# Patient Record
Sex: FEMALE | Race: BLACK OR AFRICAN AMERICAN | NOT HISPANIC OR LATINO | URBAN - METROPOLITAN AREA
[De-identification: names, ages, dates, MRNs, and addresses within clinical notes are randomized per-mention and may not be internally consistent; named-entity substitution may affect disease eponyms.]

---

## 2017-11-21 ENCOUNTER — APPOINTMENT (RX ONLY)
Dept: URBAN - METROPOLITAN AREA CLINIC 77 | Facility: CLINIC | Age: 42
Setting detail: DERMATOLOGY
End: 2017-11-21

## 2017-11-21 DIAGNOSIS — Z41.9 ENCOUNTER FOR PROCEDURE FOR PURPOSES OTHER THAN REMEDYING HEALTH STATE, UNSPECIFIED: ICD-10-CM

## 2017-11-21 PROCEDURE — ? LASER HAIR REMOVAL

## 2017-11-21 ASSESSMENT — LOCATION DETAILED DESCRIPTION DERM
LOCATION DETAILED: RIGHT ANTERIOR PROXIMAL THIGH
LOCATION DETAILED: LEFT ANTERIOR PROXIMAL THIGH
LOCATION DETAILED: RIGHT INGUINAL CREASE
LOCATION DETAILED: RIGHT BUTTOCK
LOCATION DETAILED: MONS PUBIS
LOCATION DETAILED: LEFT SUPRAPUBIC SKIN
LOCATION DETAILED: RIGHT MEDIAL BUTTOCK

## 2017-11-21 ASSESSMENT — LOCATION SIMPLE DESCRIPTION DERM
LOCATION SIMPLE: GROIN
LOCATION SIMPLE: RIGHT THIGH
LOCATION SIMPLE: LEFT THIGH
LOCATION SIMPLE: RIGHT BUTTOCK

## 2017-11-21 ASSESSMENT — LOCATION ZONE DERM
LOCATION ZONE: VULVA
LOCATION ZONE: LEG
LOCATION ZONE: TRUNK

## 2017-11-21 NOTE — PROCEDURE: LASER HAIR REMOVAL
Cooling: chill tip
Price (Use Numbers Only, No Special Characters Or $): 178.57
Pulse Duration: 0.45 ms
Device Serial Number (Optional): 0
Total Pulses: 56
Spot Size: 1.5 mm
Cooling: DCD setting
Laser Type: Vbeam 595nm
Pulse Duration: 25 ms
Post-Care Instructions: I reviewed with the patient in detail post-care instructions. Patient should avoid sun for a minimum of 4 weeks before and after treatment.
Fluence (Will Not Render If 0): 13
Total Pulses: 232
Location Override: full bikini and buttocks
Spot Size: 5 mm
Fluence (Will Not Render If 0): 20
Consent: Written consent obtained, risks reviewed including but not limited to crusting, scabbing, blistering, scarring, darker or lighter pigmentary change, paradoxical hair regrowth, incomplete removal of hair and infection.
Treatment Number: 1
Shaving (Optional): The patient shaved at home
Cooling Override: 100%
Length Of Topical Anesthesia Application (Optional): 20 minutes
Pulse Duration: 30 ms
Fluence (Will Not Render If 0): 20
Tolerated Procedure (Optional): 3 out of 10
Topical Anesthesia Type: BLT cream (benzocaine 20%, lidocaine 6%, tetracaine 4%)
Number Of Prepaid Treatments (Will Not Render If 0): 7
Detail Level: Zone
Fluence (Will Not Render If 0): 6
Pre-Procedure: Prior to proceeding the treatment areas were cleaned and all present put on their eye protection.
Post-Procedure Care: Immediate endpoint: perifollicular erythema and edema. Elta Laser Cooling Enzyme Gel and spf. Post care reviewed with patient: wear sunscreen, do not pluck or wax in between patients and use mint exfoliating scrub on day 5 for 5 days in a row.
Render Post-Care In The Note: No

## 2018-07-05 ENCOUNTER — APPOINTMENT (RX ONLY)
Dept: URBAN - METROPOLITAN AREA CLINIC 77 | Facility: CLINIC | Age: 43
Setting detail: DERMATOLOGY
End: 2018-07-05

## 2018-07-05 DIAGNOSIS — Z41.9 ENCOUNTER FOR PROCEDURE FOR PURPOSES OTHER THAN REMEDYING HEALTH STATE, UNSPECIFIED: ICD-10-CM

## 2018-07-05 PROCEDURE — ? LASER HAIR REMOVAL

## 2018-07-05 ASSESSMENT — LOCATION ZONE DERM
LOCATION ZONE: VULVA
LOCATION ZONE: LEG
LOCATION ZONE: TRUNK

## 2018-07-05 ASSESSMENT — LOCATION SIMPLE DESCRIPTION DERM
LOCATION SIMPLE: RIGHT THIGH
LOCATION SIMPLE: GROIN
LOCATION SIMPLE: GLUTEAL CLEFT
LOCATION SIMPLE: LEFT THIGH

## 2018-07-05 ASSESSMENT — LOCATION DETAILED DESCRIPTION DERM
LOCATION DETAILED: RIGHT ANTERIOR PROXIMAL THIGH
LOCATION DETAILED: RIGHT SUPRAPUBIC SKIN
LOCATION DETAILED: MONS PUBIS
LOCATION DETAILED: GLUTEAL CLEFT
LOCATION DETAILED: LEFT ANTERIOR PROXIMAL THIGH

## 2018-07-05 NOTE — PROCEDURE: LASER HAIR REMOVAL
Device Serial Number (Optional): 0
Spot Size: 1.5 mm
Cooling: chill tip
Laser Type: Nd:Yag 1064nm
Cooling Override: 100%
Pulse Duration: 25 ms
Total Pulses: 113
Cooling: DCD setting
Fluence (Will Not Render If 0): 13
Pulse Duration: 35 ms
Post-Care Instructions: I reviewed with the patient in detail post-care instructions. Patient should avoid sun for a minimum of 4 weeks before and after treatment.
Tolerated Procedure (Optional): 6 out of 10
Topical Anesthesia Type: BLT cream (benzocaine 20%, lidocaine 6%, tetracaine 4%)
Post-Procedure Care: Immediate endpoint: perifollicular erythema and edema. Elta Laser Cooling Enzyme Gel and spf. Post care reviewed with patient: wear sunscreen, do not pluck or wax in between patients and use mint exfoliating scrub on day 5 for 5 days in a row.
Length Of Topical Anesthesia Application (Optional): 30 minutes
Pulse Duration: 0.45 ms
Detail Level: Zone
Shaving (Optional): The patient shaved at home
Spot Size: 5 mm
Render Post-Care In The Note: No
Consent: Written consent obtained, risks reviewed including but not limited to crusting, scabbing, blistering, scarring, darker or lighter pigmentary change, paradoxical hair regrowth, incomplete removal of hair and infection.
Fluence (Will Not Render If 0): 20
Price (Use Numbers Only, No Special Characters Or $): 178.57
Fluence (Will Not Render If 0): 8
Total Pulses: 56
Fluence (Will Not Render If 0): 20
Pre-Procedure: Prior to proceeding the treatment areas were cleaned and all present put on their eye protection.